# Patient Record
Sex: FEMALE | Race: WHITE | NOT HISPANIC OR LATINO | URBAN - METROPOLITAN AREA
[De-identification: names, ages, dates, MRNs, and addresses within clinical notes are randomized per-mention and may not be internally consistent; named-entity substitution may affect disease eponyms.]

---

## 2021-08-25 ENCOUNTER — OFFICE VISIT (OUTPATIENT)
Dept: URBAN - METROPOLITAN AREA CLINIC 35 | Facility: CLINIC | Age: 31
End: 2021-08-25

## 2021-08-25 NOTE — HPI-MIGRATED HPI
;     Dysphagia : Patient presents today for consultation about dysphagia. Onset was  and episodes occur  times per .  Dysphagia occurs with  solids  liquids. Patient states that he/she has/has not had any choking episodes. He/She has/has not had to vomit following a dysphagia episode. Patient states that --- seems to worsen dysphagia. He/She has/has not had a previous barium swallow study. He/She has/has not had a previous EGD.;